# Patient Record
Sex: FEMALE | Race: BLACK OR AFRICAN AMERICAN | NOT HISPANIC OR LATINO | Employment: UNEMPLOYED
[De-identification: names, ages, dates, MRNs, and addresses within clinical notes are randomized per-mention and may not be internally consistent; named-entity substitution may affect disease eponyms.]

---

## 2017-11-19 ENCOUNTER — HEALTH MAINTENANCE LETTER (OUTPATIENT)
Age: 42
End: 2017-11-19

## 2018-05-19 ENCOUNTER — TRANSFERRED RECORDS (OUTPATIENT)
Dept: HEALTH INFORMATION MANAGEMENT | Facility: CLINIC | Age: 43
End: 2018-05-19

## 2018-05-19 ENCOUNTER — APPOINTMENT (OUTPATIENT)
Dept: CT IMAGING | Facility: CLINIC | Age: 43
End: 2018-05-19
Attending: NURSE PRACTITIONER
Payer: COMMERCIAL

## 2018-05-19 ENCOUNTER — HOSPITAL ENCOUNTER (EMERGENCY)
Facility: CLINIC | Age: 43
Discharge: HOME | End: 2018-05-19
Attending: EMERGENCY MEDICINE | Admitting: EMERGENCY MEDICINE
Payer: COMMERCIAL

## 2018-05-19 VITALS
SYSTOLIC BLOOD PRESSURE: 97 MMHG | TEMPERATURE: 97.3 F | OXYGEN SATURATION: 100 % | DIASTOLIC BLOOD PRESSURE: 65 MMHG | HEART RATE: 72 BPM | RESPIRATION RATE: 16 BRPM

## 2018-05-19 DIAGNOSIS — R10.32 ABDOMINAL PAIN, LEFT LOWER QUADRANT: ICD-10-CM

## 2018-05-19 DIAGNOSIS — R31.29 MICROSCOPIC HEMATURIA: ICD-10-CM

## 2018-05-19 DIAGNOSIS — M54.50 ACUTE LEFT-SIDED LOW BACK PAIN WITHOUT SCIATICA: ICD-10-CM

## 2018-05-19 LAB
ANION GAP SERPL CALCULATED.3IONS-SCNC: 3 MMOL/L (ref 3–14)
B-HCG FREE SERPL-ACNC: <5 IU/L
BUN SERPL-MCNC: 10 MG/DL (ref 7–30)
CALCIUM SERPL-MCNC: 8.6 MG/DL (ref 8.5–10.1)
CHLORIDE SERPL-SCNC: 107 MMOL/L (ref 94–109)
CO2 SERPL-SCNC: 30 MMOL/L (ref 20–32)
CREAT SERPL-MCNC: 0.59 MG/DL (ref 0.52–1.04)
GFR SERPL CREATININE-BSD FRML MDRD: >90 ML/MIN/1.7M2
GLUCOSE SERPL-MCNC: 80 MG/DL (ref 70–99)
POTASSIUM SERPL-SCNC: 3.6 MMOL/L (ref 3.4–5.3)
SODIUM SERPL-SCNC: 140 MMOL/L (ref 133–144)

## 2018-05-19 PROCEDURE — 99284 EMERGENCY DEPT VISIT MOD MDM: CPT | Mod: 25

## 2018-05-19 PROCEDURE — 74176 CT ABD & PELVIS W/O CONTRAST: CPT

## 2018-05-19 PROCEDURE — 36415 COLL VENOUS BLD VENIPUNCTURE: CPT | Performed by: NURSE PRACTITIONER

## 2018-05-19 PROCEDURE — 84702 CHORIONIC GONADOTROPIN TEST: CPT

## 2018-05-19 PROCEDURE — 80048 BASIC METABOLIC PNL TOTAL CA: CPT | Performed by: NURSE PRACTITIONER

## 2018-05-19 ASSESSMENT — ENCOUNTER SYMPTOMS
DIARRHEA: 1
VOMITING: 0
CHILLS: 0
FEVER: 0
DYSURIA: 0
ABDOMINAL PAIN: 1
FREQUENCY: 0
HEMATURIA: 1
DIZZINESS: 0
ABDOMINAL DISTENTION: 0
FLANK PAIN: 1
NAUSEA: 0
DIFFICULTY URINATING: 0
BLOOD IN STOOL: 0
FATIGUE: 0

## 2018-05-19 NOTE — ED PROVIDER NOTES
History     Chief Complaint:    Abdominal Pain      HPI   Sasha Armenta is a 43 year old female who presents with abdominal pain.  She has had 2 weeks of intermittent left flank and low back pain.  However over the last 2 days pain has worsened and is now also located in left lower quadrant of abdomen.  She describes aching pain that worsens with position changes.  Back pain worsens if  massages area.  Episodes of intense pain are associated with nausea.  No vomiting.  She also reports three to four episodes of diarrhea since yesterday.  No blood in stool.  She was seen at [facility] urgent care today.  She had the below blood work completed which including normal CBC, CRP of 2.0. Urinalysis revealed hematuria so patient sent to ED for further evaluation. Patient denies fevers, dysuria, frequency, urgency.  No known injury but states she has been more active lately.  No pain or weakness in left leg, no saddle anesthesia, no incontinence.  She has not tried any medications for pain.  No history of kidney stones or diverticulitis.  She is currently fasting for Ramadan and would not like any medications for pain until after [place] when she breaks the fast.  LMP was mid April. No previous abdominal surgeries.    CBC:  WBC 7.7, HGB 13.0, , otherwise WNL   CRP: 2.0  UA: Clear  urine, Blood small, RBC 3-4, WBC 3-4, Epithelial cells many, otherwise WNL     Allergies:  No known drug allergies.     Medications:    The patient is currently on no regular medications.      Past Medical History:    History reviewed.  No significant past medical history.      Past Surgical History:    History reviewed. No pertinent past surgical history.     Family History:    History reviewed. No pertinent family history.     Social History:  Tobacco Use: Never Used  Alcohol Use: No  PCP: JACIEL ESPINAL      Review of Systems   Constitutional: Negative for chills, fatigue and fever.   Gastrointestinal: Positive for abdominal  pain and diarrhea. Negative for abdominal distention, blood in stool, nausea and vomiting.   Genitourinary: Positive for flank pain and hematuria. Negative for difficulty urinating, dysuria, frequency and urgency.   Neurological: Negative for dizziness.   All other systems reviewed and are negative.      Physical Exam   First Vitals:  BP: 117/73  Pulse: 72  Temp: 97.3  F (36.3  C)  Resp: 20  SpO2: 100 %      Physical Exam  Constitutional: Alert, attentive, GCS 15.    HENT:  Mucous membranes are moist.   Eyes:  Conjunctiva pink, no scleral icterus or conjunctival injection  CV: regular rate and rhythm; no murmurs, rubs or gallups.  Radial pulses 2+ bilaterally.  Cap refill <2 seconds.  Respiratory: Effort normal. Lungs clear to auscultation bilaterally. No crackles/rubs/wheezes.  Good air movement.  GI:  Tenderness with palpation in LLQ; no rebound or guarding.  No distension. Normal bowel sounds.  : Left CVA tenderness.  MSK: Back: Skin intact; no erythema or swelling.Pain with palpation of left paraspinal muscles.  Full and equal strength in lower extremities. No peripheral edema or calf tenderness.  Neurological: Alert, attentive.  Skin: Skin is warm and dry.  No rashes or petechiae.  Psychiatric: Normal affect.      Emergency Department Course   Imaging:  CT Abdomen Pelvis without contrast:   1. No specific finding to explain pain.  2. Gallstone is incidentally noted.  Report per radiology.   Radiographic findings were communicated with the patient who voiced understanding of the findings.    Laboratory:  (1928) ISTAT HCG pregnancy: <5.0  BMP:  WNL (Creatinine 0.59)     Emergency Department Course:  Past medical records, nursing notes, and vitals reviewed.  I performed an exam of the patient and obtained history, as documented above.  Blood sent   IV Inserted  MD Dalton in to evaluate patient  The patient was sent for a CT while in the ED with results above.  I rechecked the patient. Findings and plan  explained to the Patient and family. Patient was discharged to home.      Impression & Plan      Medical Decision Making:    Sasha Armenta is a 43 year old female who presents for evaluation of back and abdominal pain.  A broad differential was considered including kidney stones, diverticulitis, bowel obstruction, ectopic pregnancy, musculoskeletal pain.  Blood work and urinalysis from clinic reviewed.  BMP was ordered here.  CT reassuring without evidence of ureteral calculi, diverticulitis, obstruction tumor or further worrisome etiology.  Pregnancy test negative.  My suspicion of intraabdominal catastrophe is low so will not admit for further observation.  Back pain may be musculoskeletal in nature as it is reproducible.  There has been no  trauma, therefore x-rays are not necessary due to the low likelihood of fracture or subluxation. There is no clinical evidence of cauda equina syndrome, discitis, spinal/epidural space hematoma or epidural abscess or other emergently worrisome etiology. The neurological exam is normal. Cause of hematuria is unknown.  It may represent menses as patient states she is due to period.  However this will require follow-up and patient aware of this necessity. At this point she appears safe for outpatient management.  She did not want any medication for pain in the ED.  She was advised ibuprofen/tyelnol for pain. Follow-up next week for recheck and discussion of hematuria.  Return to ED with fevers, worsening pain or changing pain and any other new and concerning symptoms.          Diagnosis:    ICD-10-CM    1. Abdominal pain, left lower quadrant R10.32    2. Acute left-sided low back pain without sciatica M54.5    3. Microscopic hematuria R31.29        Disposition:  discharged to home      Tanya Chow  5/19/2018   [facility] EMERGENCY DEPARTMENT       Tanya Chow, MANUEL [facility]  05/19/18 2146  Emergency Department Attending Supervision Note  5/20/2018  12:23 AM      I  reviewed LIL NP Hx Px Exam and Plan and agree.         Diagnosis    ICD-10-CM    1. Abdominal pain, left lower quadrant R10.32    2. Acute left-sided low back pain without sciatica M54.5    3. Microscopic hematuria R31.29          No att. providers found      Zana Dalton MD  05/20/18 0024

## 2018-05-19 NOTE — ED TRIAGE NOTES
Arrives with left abdomen/flank pain, seen at clinic prior to arrival and had abnormal blood work, alert and oriented, ABCs intact.

## 2018-05-19 NOTE — ED AVS SNAPSHOT
[facility] Emergency Department    [address]    [facility] [address]    Phone:  [phone]    Fax:  [phone]                                       Sasha Armenta   MRN: [id number]    Department:  [facility] Emergency Department   Date of Visit:  5/19/2018           After Visit Summary Signature Page     I have received my discharge instructions, and my questions have been answered. I have discussed any challenges I see with this plan with the nurse or doctor.    ..........................................................................................................................................  Patient/Patient Representative Signature      ..........................................................................................................................................  Patient Representative Print Name and Relationship to Patient    ..................................................               ................................................  Date                                            Time    ..........................................................................................................................................  Reviewed by Signature/Title    ...................................................              ..............................................  Date                                                            Time

## 2018-05-20 NOTE — DISCHARGE INSTRUCTIONS
Use ibuprofen or tylenol for pain.  Follow-up with primary MD next week to recheck your urine.  Return to ED with fevers, increasing pain, numbness/weakness in your leg, loss of bowel or bladder control, urinary symptoms or any further concern.     Discharge Instructions  Back Pain  You were seen today for back pain. Back pain can have many causes, but most will get better without surgery or other specific treatment. Sometimes there is a herniated ( slipped ) disc. We do not usually do MRI scans to look for these right away, since most herniated discs will get better on their own with time.  Today, we did not find any evidence that your back pain was caused by a serious condition. However, sometimes symptoms develop over time and cannot be found during an emergency visit, so it is very important that you follow up with your primary provider.  Generally, every Emergency Department visit should have a follow-up clinic visit with either a primary or a specialty clinic/provider. Please follow-up as instructed by your emergency provider today.    Return to the Emergency Department if:    You develop a fever with your back pain.     You have weakness or change in sensation in one or both legs.    You lose control of your bowels or bladder, or cannot empty your bladder (cannot pee).    Your pain gets much worse.     Follow-up with your provider:    Unless your pain has completely gone away, please make an appointment with your provider within one week. Most of the routine care for back pain is available in a clinic and not the Emergency Department. You may need further management of your back pain, such as more pain medication, imaging such as an X-ray or MRI, or physical therapy.    What can I do to help myself?    Remain Active -- People are often afraid that they will hurt their back further or delay recovery by remaining active, but this is one of the best things you can do for your back. In fact, staying in bed for a  long time to rest is not recommended. Studies have shown that people with low back pain recover faster when they remain active. Movement helps to bring blood flow to the muscles and relieve muscle spasms as well as preventing loss of muscle strength.    Heat -- Using a heating pad can help with low back pain during the first few weeks. Do not sleep with a heating pad, as you can be burned.     Pain medications - You may take a pain medication such as Tylenol  (acetaminophen), Advil , Motrin  (ibuprofen) or Aleve  (naproxen).  If you were given a prescription for medicine here today, be sure to read all of the information (including the package insert) that comes with your prescription.  This will include important information about the medicine, its side effects, and any warnings that you need to know about.  The pharmacist who fills the prescription can provide more information and answer questions you may have about the medicine.  If you have questions or concerns that the pharmacist cannot address, please call or return to the Emergency Department.   Remember that you can always come back to the Emergency Department if you are not able to see your regular provider in the amount of time listed above, if you get any new symptoms, or if there is anything that worries you.              Discharge Instructions  Abdominal Pain    Abdominal pain (belly pain) can be caused by many things. Your evaluation today does not show the exact cause for your pain. Your provider today has decided that it is unlikely your pain is due to a life threatening problem, or a problem requiring surgery or hospital admission. Sometimes those problems cannot be found right away, so it is very important that you follow up as directed.  Sometimes only the changes which occur over time allow the cause of your pain to be found.    Generally, every Emergency Department visit should have a follow-up clinic visit with either a primary or a specialty  clinic/provider. Please follow-up as instructed by your emergency provider today. With abdominal pain, we often recommend very close follow-up, such as the following day.    ADULTS:  Return to the Emergency Department right away if:      You get an oral temperature above 102oF or as directed by your provider.    You have blood in your stools. This may be bright red or appear as black, tarry stools.      You keep vomiting (throwing up) or cannot drink liquids.    You see blood when you vomit.     You cannot have a bowel movement or you cannot pass gas.    Your stomach gets bloated or bigger.    Your skin or the whites of your eyes look yellow.    You faint.    You have bloody, frequent or painful urination (peeing).    You have new symptoms or anything that worries you.    CHILDREN:  Return to the Emergency Department right away if your child has any of the above-listed symptoms or the following:      Pushes your hand away or screams/cries when his/her belly is touched.    You notice your child is very fussy or weak.    Your child is very tired and is too tired to eat or drink.    Your child is dehydrated.  Signs of dehydration can be:  o Significant change in the amount of wet diapers/urine.  o Your infant or child starts to have dry mouth and lips, or no saliva (spit) or tears.    PREGNANT WOMEN:  Return to the Emergency Department right away if you have any of the above-listed symptoms or the following:      You have bleeding, leaking fluid or passing tissue from the vagina.    You have worse pain or cramping, or pain in your shoulder or back.    You have vomiting that will not stop.    You have a temperature of 100oF or more.    Your baby is not moving as much as usual.    You faint.    You get a bad headache with or without eye problems and abdominal pain.    You have a seizure.    You have unusual discharge from your vagina and abdominal pain.    Abdominal pain is pretty common during pregnancy.  Your pain may  "or may not be related to your pregnancy. You should follow-up closely with your OB provider so they can evaluate you and your baby.  Until you follow-up with your regular provider, do the following:       Avoid sex and do not put anything in your vagina.    Drink clear fluids.    Only take medications approved by your provider.    MORE INFORMATION:    Appendicitis:  A possible cause of abdominal pain in any person who still has their appendix is acute appendicitis. Appendicitis is often hard to diagnose.  Testing does not always rule out early appendicitis or other causes of abdominal pain. Close follow-up with your provider and re-evaluations may be needed to figure out the reason for your abdominal pain.    Follow-up:  It is very important that you make an appointment with your clinic and go to the appointment.  If you do not follow-up with your primary provider, it may result in missing an important development which could result in permanent injury or disability and/or lasting pain.  If there is any problem keeping your appointment, call your provider or return to the Emergency Department.    Medications:  Take your medications as directed by your provider today.  Before using over-the-counter medications, ask your provider and make sure to take the medications as directed.  If you have any questions about medications, ask your provider.    Diet:  Resume your normal diet as much as possible, but do not eat fried, fatty or spicy foods while you have pain.  Do not drink alcohol or have caffeine.  Do not smoke tobacco.    Probiotics: If you have been given an antibiotic, you may want to also take a probiotic pill or eat yogurt with live cultures. Probiotics have \"good bacteria\" to help your intestines stay healthy. Studies have shown that probiotics help prevent diarrhea (loose stools) and other intestine problems (including C. diff infection) when you take antibiotics. You can buy these without a prescription in " the pharmacy section of the store.     If you were given a prescription for medicine here today, be sure to read all of the information (including the package insert) that comes with your prescription.  This will include important information about the medicine, its side effects, and any warnings that you need to know about.  The pharmacist who fills the prescription can provide more information and answer questions you may have about the medicine.  If you have questions or concerns that the pharmacist cannot address, please call or return to the Emergency Department.       Remember that you can always come back to the Emergency Department if you are not able to see your regular provider in the amount of time listed above, if you get any new symptoms, or if there is anything that worries you.

## 2019-11-03 NOTE — ED AVS SNAPSHOT
[facility] Emergency Department    [address]    [facility] [address]    Phone:  [phone]    Fax:  [phone]                                       Sasha Armenta   MRN: [id number]    Department:  [facility] Emergency Department   Date of Visit:  5/19/2018           Patient Information     Date Of Birth          1975        Your diagnoses for this visit were:     Abdominal pain, left lower quadrant     Acute left-sided low back pain without sciatica     Microscopic hematuria        You were seen by Zana Dalton MD.      Follow-up Information     Follow up with Jaime Vela MD In 1 week.    Specialty:  Family Practice    Why:  to recheck urine    Contact information:    [address]  [facility] [id number] [phone]          Follow up with [facility] Emergency Department.    Specialty:  EMERGENCY MEDICINE    Why:  As needed    Contact information:    [address] [facility] [address] [phone]        Discharge Instructions       Use ibuprofen or tylenol for pain.  Follow-up with primary MD next week to recheck your urine.  Return to ED with fevers, increasing pain, numbness/weakness in your leg, loss of bowel or bladder control, urinary symptoms or any further concern.     Discharge Instructions  Back Pain  You were seen today for back pain. Back pain can have many causes, but most will get better without surgery or other specific treatment. Sometimes there is a herniated ( slipped ) disc. We do not usually do MRI scans to look for these right away, since most herniated discs will get better on their own with time.  Today, we did not find any evidence that your back pain was caused by a serious condition. However, sometimes symptoms develop over time and cannot be found during an emergency visit, so it is very important that you follow up with your primary provider.  Generally, every Emergency Department  visit should have a follow-up clinic visit with either a primary or a specialty clinic/provider. Please follow-up as instructed by your emergency provider today.    Return to the Emergency Department if:    You develop a fever with your back pain.     You have weakness or change in sensation in one or both legs.    You lose control of your bowels or bladder, or cannot empty your bladder (cannot pee).    Your pain gets much worse.     Follow-up with your provider:    Unless your pain has completely gone away, please make an appointment with your provider within one week. Most of the routine care for back pain is available in a clinic and not the Emergency Department. You may need further management of your back pain, such as more pain medication, imaging such as an X-ray or MRI, or physical therapy.    What can I do to help myself?    Remain Active -- People are often afraid that they will hurt their back further or delay recovery by remaining active, but this is one of the best things you can do for your back. In fact, staying in bed for a long time to rest is not recommended. Studies have shown that people with low back pain recover faster when they remain active. Movement helps to bring blood flow to the muscles and relieve muscle spasms as well as preventing loss of muscle strength.    Heat -- Using a heating pad can help with low back pain during the first few weeks. Do not sleep with a heating pad, as you can be burned.     Pain medications - You may take a pain medication such as Tylenol  (acetaminophen), Advil , Motrin  (ibuprofen) or Aleve  (naproxen).  If you were given a prescription for medicine here today, be sure to read all of the information (including the package insert) that comes with your prescription.  This will include important information about the medicine, its side effects, and any warnings that you need to know about.  The pharmacist who fills the prescription can provide more information  and answer questions you may have about the medicine.  If you have questions or concerns that the pharmacist cannot address, please call or return to the Emergency Department.   Remember that you can always come back to the Emergency Department if you are not able to see your regular provider in the amount of time listed above, if you get any new symptoms, or if there is anything that worries you.              Discharge Instructions  Abdominal Pain    Abdominal pain (belly pain) can be caused by many things. Your evaluation today does not show the exact cause for your pain. Your provider today has decided that it is unlikely your pain is due to a life threatening problem, or a problem requiring surgery or hospital admission. Sometimes those problems cannot be found right away, so it is very important that you follow up as directed.  Sometimes only the changes which occur over time allow the cause of your pain to be found.    Generally, every Emergency Department visit should have a follow-up clinic visit with either a primary or a specialty clinic/provider. Please follow-up as instructed by your emergency provider today. With abdominal pain, we often recommend very close follow-up, such as the following day.    ADULTS:  Return to the Emergency Department right away if:      You get an oral temperature above 102oF or as directed by your provider.    You have blood in your stools. This may be bright red or appear as black, tarry stools.      You keep vomiting (throwing up) or cannot drink liquids.    You see blood when you vomit.     You cannot have a bowel movement or you cannot pass gas.    Your stomach gets bloated or bigger.    Your skin or the whites of your eyes look yellow.    You faint.    You have bloody, frequent or painful urination (peeing).    You have new symptoms or anything that worries you.    CHILDREN:  Return to the Emergency Department right away if your child has any of the above-listed symptoms  or the following:      Pushes your hand away or screams/cries when his/her belly is touched.    You notice your child is very fussy or weak.    Your child is very tired and is too tired to eat or drink.    Your child is dehydrated.  Signs of dehydration can be:  o Significant change in the amount of wet diapers/urine.  o Your infant or child starts to have dry mouth and lips, or no saliva (spit) or tears.    PREGNANT WOMEN:  Return to the Emergency Department right away if you have any of the above-listed symptoms or the following:      You have bleeding, leaking fluid or passing tissue from the vagina.    You have worse pain or cramping, or pain in your shoulder or back.    You have vomiting that will not stop.    You have a temperature of 100oF or more.    Your baby is not moving as much as usual.    You faint.    You get a bad headache with or without eye problems and abdominal pain.    You have a seizure.    You have unusual discharge from your vagina and abdominal pain.    Abdominal pain is pretty common during pregnancy.  Your pain may or may not be related to your pregnancy. You should follow-up closely with your OB provider so they can evaluate you and your baby.  Until you follow-up with your regular provider, do the following:       Avoid sex and do not put anything in your vagina.    Drink clear fluids.    Only take medications approved by your provider.    MORE INFORMATION:    Appendicitis:  A possible cause of abdominal pain in any person who still has their appendix is acute appendicitis. Appendicitis is often hard to diagnose.  Testing does not always rule out early appendicitis or other causes of abdominal pain. Close follow-up with your provider and re-evaluations may be needed to figure out the reason for your abdominal pain.    Follow-up:  It is very important that you make an appointment with your clinic and go to the appointment.  If you do not follow-up with your primary provider, it may result  "in missing an important development which could result in permanent injury or disability and/or lasting pain.  If there is any problem keeping your appointment, call your provider or return to the Emergency Department.    Medications:  Take your medications as directed by your provider today.  Before using over-the-counter medications, ask your provider and make sure to take the medications as directed.  If you have any questions about medications, ask your provider.    Diet:  Resume your normal diet as much as possible, but do not eat fried, fatty or spicy foods while you have pain.  Do not drink alcohol or have caffeine.  Do not smoke tobacco.    Probiotics: If you have been given an antibiotic, you may want to also take a probiotic pill or eat yogurt with live cultures. Probiotics have \"good bacteria\" to help your intestines stay healthy. Studies have shown that probiotics help prevent diarrhea (loose stools) and other intestine problems (including C. diff infection) when you take antibiotics. You can buy these without a prescription in the pharmacy section of the store.     If you were given a prescription for medicine here today, be sure to read all of the information (including the package insert) that comes with your prescription.  This will include important information about the medicine, its side effects, and any warnings that you need to know about.  The pharmacist who fills the prescription can provide more information and answer questions you may have about the medicine.  If you have questions or concerns that the pharmacist cannot address, please call or return to the Emergency Department.       Remember that you can always come back to the Emergency Department if you are not able to see your regular provider in the amount of time listed above, if you get any new symptoms, or if there is anything that worries you.      Discharge References/Attachments     HEMATURIA, WHAT IS (ENGLISH)      24 Hour " Appointment Hotline       To make an appointment at any [facility], call [phone] ([phone]). If you don't have a family doctor or clinic, we will help you find one. [place] clinics are conveniently located to serve the needs of you and your family.             Review of your medicines      Our records show that you are taking the medicines listed below. If these are incorrect, please call your family doctor or clinic.        Dose / Directions Last dose taken    azithromycin 500 MG tablet   Commonly known as:  ZITHROMAX   Dose:  500 mg   Quantity:  3 tablet        Take 1 tablet by mouth daily.   Refills:  0        guaiFENesin-codeine 100-10 MG/5ML Soln solution   Commonly known as:  ROBITUSSIN AC   Dose:  2 tsp.   Quantity:  240 mL        Take 10 mLs by mouth every 4 hours as needed.   Refills:  0        IBUPROFEN PO   Dose:  1 tablet        Take 1 tablet by mouth as needed.   Refills:  0                Procedures and tests performed during your visit     Basic metabolic panel    CT Abdomen Pelvis w/o Contrast    ISTAT HCG Quantitative Pregnancy Nursing POCT    ISTAT HCG Quantitative Pregnancy POCT      Orders Needing Specimen Collection     None      Pending Results     No orders found from 5/17/2018 to 5/20/2018.            Pending Culture Results     No orders found from 5/17/2018 to 5/20/2018.            Pending Results Instructions     If you had any lab results that were not finalized at the time of your Discharge, you can call the ED Lab Result RN at [phone]. You will be contacted by this team for any positive Lab results or changes in treatment. The nurses are available 7 days a week from 10A to 6:30P.  You can leave a message 24 hours per day and they will return your call.        Test Results From Your Hospital Stay        5/19/2018  6:45 PM      Narrative     CT ABDOMEN PELVIS W/O CONTRAST 5/19/2018 6:40 PM    HISTORY: Left lower quadrant and left flank pain.    COMPARISON:  None.    TECHNIQUE: Noncontrast abdomen and pelvis CT.  Radiation dose for this  scan was reduced using automated exposure control, adjustment of the  mA and/or kV according to patient size, or iterative reconstruction  technique.    FINDINGS: Lung bases are clear.    No hydronephrosis. No renal or ureteral calculus.    Within limits of noncontrast technique: Gallbladder is contracted.  There is a single dense gallstone. Liver, spleen, pancreas and adrenal  glands have normal morphology.    Normal caliber bowel. Normal appendix. No free air. No free or  loculated fluid.    Urinary bladder is collapsed. Uterus and adnexa appear normal. No free  fluid in the pelvis.        Impression     IMPRESSION:   1. No specific finding to explain pain.  2. Gallstone is incidentally noted.    PASTOR FRANCO MD         5/19/2018  6:35 PM      Component Results     Component Value Ref Range & Units Status    Sodium 140 133 - 144 mmol/L Final    Potassium 3.6 3.4 - 5.3 mmol/L Final    Chloride 107 94 - 109 mmol/L Final    Carbon Dioxide 30 20 - 32 mmol/L Final    Anion Gap 3 3 - 14 mmol/L Final    Glucose 80 70 - 99 mg/dL Final    Urea Nitrogen 10 7 - 30 mg/dL Final    Creatinine 0.59 0.52 - 1.04 mg/dL Final    GFR Estimate >90 >60 mL/min/1.7m2 Final    Non  GFR Calc    GFR Estimate If Black >90 >60 mL/min/1.7m2 Final    African American GFR Calc    Calcium 8.6 8.5 - 10.1 mg/dL Final         5/19/2018  7:41 PM      Component Results     Component Value Ref Range & Units Status    HCG Quantitative Serum <5.0 <5.0 IU/L Final                Clinical Quality Measure: Blood Pressure Screening     Your blood pressure was checked while you were in the emergency department today. The last reading we obtained was  BP: 103/66 . Please read the guidelines below about what these numbers mean and what you should do about them.  If your systolic blood pressure (the top number) is less than 120 and your diastolic blood pressure (the  "bottom number) is less than 80, then your blood pressure is normal. There is nothing more that you need to do about it.  If your systolic blood pressure (the top number) is 120-139 or your diastolic blood pressure (the bottom number) is 80-89, your blood pressure may be higher than it should be. You should have your blood pressure rechecked within a year by a primary care provider.  If your systolic blood pressure (the top number) is 140 or greater or your diastolic blood pressure (the bottom number) is 90 or greater, you may have high blood pressure. High blood pressure is treatable, but if left untreated over time it can put you at risk for heart attack, stroke, or kidney failure. You should have your blood pressure rechecked by a primary care provider within the next 4 weeks.  If your provider in the emergency department today gave you specific instructions to follow-up with your doctor or provider even sooner than that, you should follow that instruction and not wait for up to 4 weeks for your follow-up visit.        Thank you for choosing [facility]       Thank you for choosing [facility] for your care. Our goal is always to provide you with excellent care. Hearing back from our patients is one way we can continue to improve our services. Please take a few minutes to complete the written survey that you may receive in the mail after you visit with us. Thank you!        [employer] Information     [employer] lets you send messages to your doctor, view your test results, renew your prescriptions, schedule appointments and more. To sign up, go to [email or web address] . Click on \"Log in\" on the left side of the screen, which will take you to the Welcome page. Then click on \"Sign up Now\" on the right side of the page.     You will be asked to enter the access code listed below, as well as some personal information. Please follow the directions to create your username and password.     Your access code is: " [email or web address]  Expires: 2018  8:02 PM     Your access code will  in 90 days. If you need help or a new code, please call your [facility] clinic or [phone].        Care EveryWhere ID     This is your Care EveryWhere ID. This could be used by other organizations to access your [facility] medical records  [id number]        Equal Access to Services     [facility] : Hadii kevin doddo Soonel, waaxda luqadaha, qaybta kaalmada aderufinayada, pepe vences . So [facility] [phone].    ATENCIÓN: Si habla español, tiene a chance disposición servicios gratuitos de asistencia lingüística. Llame al [phone].    We comply with applicable federal civil rights laws and [place] laws. We do not discriminate on the basis of race, color, national origin, age, disability, sex, sexual orientation, or gender identity.            After Visit Summary       This is your record. Keep this with you and show to your community pharmacist(s) and doctor(s) at your next visit.                   negative - No discharge, No redness

## 2021-07-20 ENCOUNTER — RECORDS - HEALTHEAST (OUTPATIENT)
Dept: ADMINISTRATIVE | Facility: CLINIC | Age: 46
End: 2021-07-20

## 2023-08-06 ENCOUNTER — HOSPITAL ENCOUNTER (EMERGENCY)
Facility: CLINIC | Age: 48
Discharge: HOME | End: 2023-08-06
Attending: EMERGENCY MEDICINE | Admitting: EMERGENCY MEDICINE
Payer: COMMERCIAL

## 2023-08-06 ENCOUNTER — APPOINTMENT (OUTPATIENT)
Dept: ULTRASOUND IMAGING | Facility: CLINIC | Age: 48
End: 2023-08-06
Attending: EMERGENCY MEDICINE
Payer: COMMERCIAL

## 2023-08-06 ENCOUNTER — APPOINTMENT (OUTPATIENT)
Dept: GENERAL RADIOLOGY | Facility: CLINIC | Age: 48
End: 2023-08-06
Attending: EMERGENCY MEDICINE
Payer: COMMERCIAL

## 2023-08-06 VITALS
DIASTOLIC BLOOD PRESSURE: 74 MMHG | TEMPERATURE: 97 F | RESPIRATION RATE: 18 BRPM | HEART RATE: 89 BPM | OXYGEN SATURATION: 97 % | SYSTOLIC BLOOD PRESSURE: 123 MMHG

## 2023-08-06 DIAGNOSIS — M25.562 ACUTE PAIN OF LEFT KNEE: ICD-10-CM

## 2023-08-06 LAB
ANION GAP SERPL CALCULATED.3IONS-SCNC: 9 MMOL/L (ref 7–15)
BASOPHILS # BLD AUTO: 0 10E3/UL (ref 0–0.2)
BASOPHILS NFR BLD AUTO: 0 %
BUN SERPL-MCNC: 14.8 MG/DL (ref 6–20)
CALCIUM SERPL-MCNC: 9.2 MG/DL (ref 8.6–10)
CHLORIDE SERPL-SCNC: 105 MMOL/L (ref 98–107)
CREAT SERPL-MCNC: 0.63 MG/DL (ref 0.51–0.95)
CRP SERPL-MCNC: 27.47 MG/L
DEPRECATED HCO3 PLAS-SCNC: 27 MMOL/L (ref 22–29)
EOSINOPHIL # BLD AUTO: 0.3 10E3/UL (ref 0–0.7)
EOSINOPHIL NFR BLD AUTO: 4 %
ERYTHROCYTE [DISTWIDTH] IN BLOOD BY AUTOMATED COUNT: 14.3 % (ref 10–15)
ERYTHROCYTE [SEDIMENTATION RATE] IN BLOOD BY WESTERGREN METHOD: 42 MM/HR (ref 0–30)
GFR SERPL CREATININE-BSD FRML MDRD: >90 ML/MIN/1.73M2
GLUCOSE SERPL-MCNC: 98 MG/DL (ref 70–99)
HCT VFR BLD AUTO: 33.4 % (ref 35–47)
HGB BLD-MCNC: 10.9 G/DL (ref 11.7–15.7)
IMM GRANULOCYTES # BLD: 0 10E3/UL
IMM GRANULOCYTES NFR BLD: 0 %
LYMPHOCYTES # BLD AUTO: 2.7 10E3/UL (ref 0.8–5.3)
LYMPHOCYTES NFR BLD AUTO: 35 %
MCH RBC QN AUTO: 28.2 PG (ref 26.5–33)
MCHC RBC AUTO-ENTMCNC: 32.6 G/DL (ref 31.5–36.5)
MCV RBC AUTO: 87 FL (ref 78–100)
MONOCYTES # BLD AUTO: 0.6 10E3/UL (ref 0–1.3)
MONOCYTES NFR BLD AUTO: 8 %
NEUTROPHILS # BLD AUTO: 4.1 10E3/UL (ref 1.6–8.3)
NEUTROPHILS NFR BLD AUTO: 53 %
NRBC # BLD AUTO: 0 10E3/UL
NRBC BLD AUTO-RTO: 0 /100
PLATELET # BLD AUTO: 180 10E3/UL (ref 150–450)
POTASSIUM SERPL-SCNC: 3.6 MMOL/L (ref 3.4–5.3)
RBC # BLD AUTO: 3.86 10E6/UL (ref 3.8–5.2)
SODIUM SERPL-SCNC: 141 MMOL/L (ref 136–145)
WBC # BLD AUTO: 7.7 10E3/UL (ref 4–11)

## 2023-08-06 PROCEDURE — 250N000013 HC RX MED GY IP 250 OP 250 PS 637: Performed by: EMERGENCY MEDICINE

## 2023-08-06 PROCEDURE — 85025 COMPLETE CBC W/AUTO DIFF WBC: CPT | Performed by: EMERGENCY MEDICINE

## 2023-08-06 PROCEDURE — 20610 DRAIN/INJ JOINT/BURSA W/O US: CPT

## 2023-08-06 PROCEDURE — 99285 EMERGENCY DEPT VISIT HI MDM: CPT | Mod: 25

## 2023-08-06 PROCEDURE — 86140 C-REACTIVE PROTEIN: CPT | Performed by: EMERGENCY MEDICINE

## 2023-08-06 PROCEDURE — 85652 RBC SED RATE AUTOMATED: CPT | Performed by: EMERGENCY MEDICINE

## 2023-08-06 PROCEDURE — 93971 EXTREMITY STUDY: CPT | Mod: LT

## 2023-08-06 PROCEDURE — 36415 COLL VENOUS BLD VENIPUNCTURE: CPT | Performed by: EMERGENCY MEDICINE

## 2023-08-06 PROCEDURE — 80048 BASIC METABOLIC PNL TOTAL CA: CPT | Performed by: EMERGENCY MEDICINE

## 2023-08-06 PROCEDURE — 73562 X-RAY EXAM OF KNEE 3: CPT | Mod: LT

## 2023-08-06 PROCEDURE — 86618 LYME DISEASE ANTIBODY: CPT | Performed by: EMERGENCY MEDICINE

## 2023-08-06 RX ORDER — IBUPROFEN 600 MG/1
600 TABLET, FILM COATED ORAL ONCE
Status: COMPLETED | OUTPATIENT
Start: 2023-08-06 | End: 2023-08-06

## 2023-08-06 RX ORDER — LIDOCAINE HYDROCHLORIDE AND EPINEPHRINE 10; 10 MG/ML; UG/ML
INJECTION, SOLUTION INFILTRATION; PERINEURAL
Status: DISCONTINUED
Start: 2023-08-06 | End: 2023-08-06 | Stop reason: HOSPADM

## 2023-08-06 RX ADMIN — IBUPROFEN 600 MG: 600 TABLET ORAL at 18:06

## 2023-08-06 ASSESSMENT — ACTIVITIES OF DAILY LIVING (ADL)
ADLS_ACUITY_SCORE: 35
ADLS_ACUITY_SCORE: 33

## 2023-08-06 NOTE — ED PROVIDER NOTES
History     Chief Complaint:  Knee Pain     HPI   Sasha Armenta is a 48 year old female who present with knee pain. She explains that she has recently had bouts of left knee pain. Two days ago this pain worsened significantly and the knee began to swell, she now presents explaining that she is having trouble ambulating due to pain. She adds that she has further pain the left calf and ankle. She denies suffering any potential injury or known history of gout or arthritis. She reports having taken Tylenol shortly prior to arrival.  No known tick exposure.    Independent Historian:   History is provided in conjunction with family.       Medications:    Zithromax  Robitussin  Ibuprofen  Prevacid    Past Medical History:    Bronchiectasis  Hepatitis B  Helicobacter pylori infection  Calculus of gallbladder without cholecystitis  Vitamin D insufficiency    Past Surgical History:    No past surgical history on file.     Physical Exam   Patient Vitals for the past 24 hrs:   BP Temp Temp src Pulse Resp SpO2   08/06/23 2242 -- -- -- 89 18 97 %   08/06/23 2132 -- -- -- -- -- 99 %   08/06/23 2131 -- -- -- -- -- 100 %   08/06/23 2130 -- -- -- -- -- 99 %   08/06/23 2129 -- -- -- -- -- 100 %   08/06/23 2128 -- -- -- -- -- 99 %   08/06/23 2127 -- -- -- -- -- 98 %   08/06/23 2126 -- -- -- -- -- 99 %   08/06/23 2100 -- -- -- -- -- 100 %   08/06/23 2058 -- -- -- -- -- 100 %   08/06/23 2057 -- -- -- -- -- 100 %   08/06/23 2056 -- -- -- -- -- 100 %   08/06/23 2045 -- -- -- -- -- 99 %   08/06/23 2043 -- -- -- -- -- 100 %   08/06/23 2042 -- -- -- -- -- 100 %   08/06/23 2041 -- -- -- -- -- 100 %   08/06/23 2040 -- -- -- -- -- 100 %   08/06/23 1801 123/74 97  F (36.1  C) Temporal 92 16 100 %        Physical Exam  Left Lower Extremity:  Small effusion, 90 degrees flexion,pain with flexion, 180 degrees extension, no overt ligamentous laxity, unable to fully perform Golden secondary to pain., able to initiate extension at the knee, no  palpable quadriceps or patella tendon defect,    CV: 2+ DP and PT pulses left lower ext  Neuro: sensation intact over distal  Skin: no break in skin, No redness or warmth. Overlying skin normal    Emergency Department Course     Imaging:  XR Knee Left 3 Views   Final Result   IMPRESSION: Small effusion. Normal otherwise.      US Lower Extremity Venous Duplex Left   Final Result   IMPRESSION:   1.  No deep venous thrombosis in the left lower extremity.         Report per radiology    Laboratory:  Labs Ordered and Resulted from Time of ED Arrival to Time of ED Departure   ERYTHROCYTE SEDIMENTATION RATE AUTO - Abnormal       Result Value    Erythrocyte Sedimentation Rate 42 (*)    CRP INFLAMMATION - Abnormal    CRP Inflammation 27.47 (*)    CBC WITH PLATELETS AND DIFFERENTIAL - Abnormal    WBC Count 7.7      RBC Count 3.86      Hemoglobin 10.9 (*)     Hematocrit 33.4 (*)     MCV 87      MCH 28.2      MCHC 32.6      RDW 14.3      Platelet Count 180      % Neutrophils 53      % Lymphocytes 35      % Monocytes 8      % Eosinophils 4      % Basophils 0      % Immature Granulocytes 0      NRBCs per 100 WBC 0      Absolute Neutrophils 4.1      Absolute Lymphocytes 2.7      Absolute Monocytes 0.6      Absolute Eosinophils 0.3      Absolute Basophils 0.0      Absolute Immature Granulocytes 0.0      Absolute NRBCs 0.0     BASIC METABOLIC PANEL - Normal    Sodium 141      Potassium 3.6      Chloride 105      Carbon Dioxide (CO2) 27      Anion Gap 9      Urea Nitrogen 14.8      Creatinine 0.63      Calcium 9.2      Glucose 98      GFR Estimate >90     LYME DISEASE TOTAL ABS BLD WITH REFLEX TO CONFIRM CLIA        Procedures      Arthrocentesis     Procedure: Arthrocentesis    Indication: Joint Pain and swelling    Procedure: After obtaining informed consent, I prepped the area with Betadine.  I raised a small wheal of lidocaine with epi for skin anesthesia.  Using sterile technique, I advanced a 18-gauge needle to perform joint  aspiration.  Patient did not tolerate this and immediately after inserting the needle requested that I stop.  I did promptly withdraw the needle.  I explained to her that I had not aspirated any fluid would be unable to perform further evaluation.  She stated that she did not want to continue the procedure.  Procedure was aborted.  Band-Aid was placed.        Emergency Department Course & Assessments:    Interventions:  Medications   ibuprofen (ADVIL/MOTRIN) tablet 600 mg (600 mg Oral $Given 8/6/23 1806)      Assessments:  1906 I obtained history and examined the patient as noted above.  2150 I rechecked the patient and explained findings.  2211 I performed the arthrocentesis procedure as detailed above.    Independent Interpretation (X-rays, CTs, rhythm strip):  I reviewed patients knee xray. No fracture observed.    Consultations/Discussion of Management or Tests:  None     Social Determinants of Health affecting care:   None    Disposition:  The patient was discharged to home.     Impression & Plan      Medical Decision Making:  Patient presents for acute left knee pain.  No trauma.  Small effusion noted on exam.  No redness or warmth.  X-ray of the knee shows small effusion however no other acute abnormality.  Lower extremity ultrasound negative for DVT.  Laboratory studies showed very mildly elevated inflammatory markers.  I discussed these results with the patient.  I discussed that septic arthritis and gout and Lyme arthritis remain on the differential.  I offered arthrocentesis for further diagnosis.  Patient initially consented to this however did not tolerate the procedure well.  I did offer the patient some pain medication however she did again declined.  I discussed with her that we did not fully evaluate for infection and should she develop fever increasing pain redness or swelling change return the emergency department to readdress this.  Otherwise, follow-up with orthopedics.  Sports medicine referral  sent.  Signs and symptoms for which return the emergency room were discussed.  Discharge home.    Diagnosis:    ICD-10-CM    1. Acute pain of left knee  M25.562 Orthopedic  Referral           Discharge Medications:  None     Scribe Disclosure:  I, SRIDHAR VEE, am serving as a scribe at 7:18 PM on 8/6/2023 to document services personally performed by Zeina Menon MD, based on my observations and the provider's statements to me.          Zeina Menon MD  08/07/23 0146

## 2023-08-06 NOTE — ED TRIAGE NOTES
Atraumatic left knee swelling and pain since yesterday. Denies fevers. Tylenol 1 hr PTA. Swelling, redness and warmth noted to left knee. No numbness or tingling.

## 2023-08-07 LAB — B BURGDOR IGG+IGM SER QL: 0.41

## 2023-08-07 NOTE — DISCHARGE INSTRUCTIONS
You presented for knee pain today.  We did not find a specific cause of your symptoms.  A Lyme screen was sent and is pending.  Please follow-up on this with either orthopedics or primary care.     If increasing pain, redness, swelling, fever I return to the emergency department.    Take tylenol 1000mg 4x per day as needed.  Do not take more than 4000mg tylenol in 24 hours.    Take ibuprofen 600 mg 3x per day.  This will provide both pain control and fight against inflammation.

## 2024-05-06 ENCOUNTER — HOSPITAL ENCOUNTER (EMERGENCY)
Facility: CLINIC | Age: 49
Discharge: HOME | End: 2024-05-06
Attending: EMERGENCY MEDICINE | Admitting: EMERGENCY MEDICINE
Payer: COMMERCIAL

## 2024-05-06 VITALS
SYSTOLIC BLOOD PRESSURE: 112 MMHG | WEIGHT: 177.03 LBS | OXYGEN SATURATION: 99 % | TEMPERATURE: 97.5 F | HEIGHT: 70 IN | RESPIRATION RATE: 20 BRPM | DIASTOLIC BLOOD PRESSURE: 71 MMHG | HEART RATE: 65 BPM | BODY MASS INDEX: 25.34 KG/M2

## 2024-05-06 DIAGNOSIS — M25.50 POLYARTHRALGIA: ICD-10-CM

## 2024-05-06 PROCEDURE — 99284 EMERGENCY DEPT VISIT MOD MDM: CPT

## 2024-05-06 PROCEDURE — 250N000013 HC RX MED GY IP 250 OP 250 PS 637: Performed by: EMERGENCY MEDICINE

## 2024-05-06 RX ORDER — PREDNISONE 10 MG/1
TABLET ORAL
Qty: 32 TABLET | Refills: 0 | Status: SHIPPED | OUTPATIENT
Start: 2024-05-06 | End: 2024-05-16

## 2024-05-06 RX ORDER — NAPROXEN 500 MG/1
500 TABLET ORAL 2 TIMES DAILY WITH MEALS
Qty: 60 TABLET | Refills: 0 | Status: SHIPPED | OUTPATIENT
Start: 2024-05-06

## 2024-05-06 RX ORDER — ACETAMINOPHEN 325 MG/1
975 TABLET ORAL ONCE
Status: COMPLETED | OUTPATIENT
Start: 2024-05-06 | End: 2024-05-06

## 2024-05-06 RX ORDER — IBUPROFEN 600 MG/1
600 TABLET, FILM COATED ORAL ONCE
Status: COMPLETED | OUTPATIENT
Start: 2024-05-06 | End: 2024-05-06

## 2024-05-06 RX ADMIN — ACETAMINOPHEN 975 MG: 325 TABLET, FILM COATED ORAL at 16:25

## 2024-05-06 RX ADMIN — IBUPROFEN 600 MG: 600 TABLET, FILM COATED ORAL at 16:25

## 2024-05-06 ASSESSMENT — COLUMBIA-SUICIDE SEVERITY RATING SCALE - C-SSRS
1. IN THE PAST MONTH, HAVE YOU WISHED YOU WERE DEAD OR WISHED YOU COULD GO TO SLEEP AND NOT WAKE UP?: NO
2. HAVE YOU ACTUALLY HAD ANY THOUGHTS OF KILLING YOURSELF IN THE PAST MONTH?: NO
6. HAVE YOU EVER DONE ANYTHING, STARTED TO DO ANYTHING, OR PREPARED TO DO ANYTHING TO END YOUR LIFE?: NO

## 2024-05-06 ASSESSMENT — ACTIVITIES OF DAILY LIVING (ADL): ADLS_ACUITY_SCORE: 33

## 2024-05-06 NOTE — ED TRIAGE NOTES
Pt arrives for bilateral shoulder and hand pain x2-3 weeks, worsening today. Hx arthritis, but has not seen specialist yet. She reports sometimes it is hard to bend fingers and lift arms over her head, she describes it as nerve pain. Denies numbness/tingling. Took ibuprofen at 0700.

## 2024-05-07 NOTE — DISCHARGE INSTRUCTIONS
Prednisone (steroid) taper: 4 tablets for 5 days, 2 tablets for 3 days, 1 tablet for 3 days, and half tablet for 3 days.    Naprosyn every 12 hours  Follow up with Rheumatology  Return for fevers, chills, or red joints

## 2024-05-07 NOTE — ED PROVIDER NOTES
"History   Chief Complaint:  Joint Pain     HPI   Sasha Armenta is a 49 year old female who presents today with polyarthralgia.  She reports that over the last several months, she has been having progressively worsening joint pain in her fingers wrists elbows shoulders, ankles and knees.  She reports having stiffness in these joints and associated swelling.  She occasionally uses Tylenol and ibuprofen which helps pain transiently, but pain comes back.  She states that the pain is severely impacting her life in a negative way.  She has been not able to sleep due to pain.  Presents today for discussion of further pain management and consideration of a rheumatology referral.  She has not been having any fevers or chills.  She has not had any recent injuries that would exacerbate the pain.  She has not been having any urinary symptoms and denies concern for sexually transmitted infection.    Independent Historian:    at bedside assisting the above history.    Review of External Notes:   Reviewed progress notes from 4/24/2024.  Patient was seen for polyarthralgia.  She has historically had a negative Lyme test dating back to just under 1 year ago.  On 4/24, she had a CCP antibody that is positive.  She had a mildly elevated CRP.  ZACHARY negative.  CBC within normal limits.    Medications:    naproxen (NAPROSYN) 500 MG tablet  predniSONE (DELTASONE) 10 MG tablet  azithromycin (ZITHROMAX) 500 MG tablet  guaiFENesin-codeine (ROBITUSSIN AC) 100-10 MG/5ML SOLN  IBUPROFEN PO      Past Medical History:    No past medical history on file.    Past Surgical History:    No past surgical history on file.     Physical Exam   Patient Vitals for the past 24 hrs:   BP Temp Temp src Pulse Resp SpO2 Height Weight   05/06/24 1621 116/76 97.5  F (36.4  C) Temporal 84 20 99 % 1.778 m (5' 10\") 80.3 kg (177 lb 0.5 oz)        Physical Exam  /71   Pulse 65   Temp 97.5  F (36.4  C) (Temporal)   Resp 20   Ht 1.778 m (5' 10\")   Wt 80.3 " kg (177 lb 0.5 oz)   SpO2 99%   BMI 25.40 kg/m     General: Pleasant middle aged female. Examined in room 31.  Head: Atraumatic.   EENT: Moist mucus membranes.   CV: Regular rate.  Respiratory: Breathing comfortably on room air.  Normal work of breathing.  Msk: Reports diffuse pain with range of motion of the MCP joints of all digits bilaterally, wrists, elbows.  Pain with range of motion of the ankles.  Joints are not significantly edematous or boggy.  No joint effusions palpable.  No overlying joint erythema.  Skin: Warm and dry. No rashes.  Neuro: Awake, alert, and conversant.  Psych: Appropriate mood and affect.    Emergency Department Course   Emergency Department Course & Assessments:  Interventions:  Medications   acetaminophen (TYLENOL) tablet 975 mg (975 mg Oral $Given 5/6/24 1625)   ibuprofen (ADVIL/MOTRIN) tablet 600 mg (600 mg Oral $Given 5/6/24 1625)      Assessments and Consultations:  2010   I performed my initial evaluation of the patient    Independent Interpretation (X-rays, CTs, rhythm strip):  None    Social Determinants of Health affecting care:   None    Disposition:  The patient was discharged to home.     Impression & Plan    Medical Decision Making:  This is a 49-year-old female coming in today.  On arrival, vital signs are stable.  Differential included Lyme disease, arthritis, disseminated gonococcal arthritis, septic arthritis, viral syndrome.  On chart review, the patient was evaluated for this about a week and a half ago and lab work concerning for autoimmune disorder.  Her history sounds concerning for rheumatoid arthritis.  Joints are not boggy and there is no joint effusions to suggest a septic arthritis.  She denies any urinary symptoms and concerns for STIs, doubt disseminated gonococcal arthritis.  Given ongoing long-term symptoms, suspect this is indeed an autoimmune condition.  She had a negative Lyme test last summer.  Given concern for rheumatoid arthritis and severe pain  impacting her quality of life, will treat her with a prednisone taper.  Also prescribed her Naprosyn which she will take twice daily.  Referral placed to rheumatology.  She will follow-up with her primary care provider in the next 1 week and certainly return if she develops any fevers or chills.  Findings and plan discussed in detail with the patient.  She was in agreement the plan and was discharged home in stable condition.    Diagnosis:    ICD-10-CM    1. Polyarthralgia  M25.50 Adult Rheumatology  Referral         Discharge Medications:  New Prescriptions    NAPROXEN (NAPROSYN) 500 MG TABLET    Take 1 tablet (500 mg) by mouth 2 times daily (with meals)    PREDNISONE (DELTASONE) 10 MG TABLET    Take 4 tablets daily for 5 days,  take 2 tablets daily for 3 days, take 1 tablet daily for 3 days, take half a tablet for 3 days.        Diana Adam PA-C  May 6, 2024   [facility]           Diana Adam PA-C  05/06/24 2050